# Patient Record
Sex: FEMALE | Race: WHITE | URBAN - METROPOLITAN AREA
[De-identification: names, ages, dates, MRNs, and addresses within clinical notes are randomized per-mention and may not be internally consistent; named-entity substitution may affect disease eponyms.]

---

## 2017-01-03 ENCOUNTER — OFFICE VISIT (OUTPATIENT)
Dept: GASTROENTEROLOGY | Facility: CLINIC | Age: 72
End: 2017-01-03
Attending: INTERNAL MEDICINE
Payer: MEDICARE

## 2017-01-03 VITALS
TEMPERATURE: 98.5 F | HEART RATE: 63 BPM | SYSTOLIC BLOOD PRESSURE: 135 MMHG | DIASTOLIC BLOOD PRESSURE: 81 MMHG | BODY MASS INDEX: 33.15 KG/M2 | HEIGHT: 61 IN | WEIGHT: 175.6 LBS | OXYGEN SATURATION: 93 %

## 2017-01-03 DIAGNOSIS — K22.2 ESOPHAGEAL STRICTURE: ICD-10-CM

## 2017-01-03 DIAGNOSIS — R13.10 DYSPHAGIA, UNSPECIFIED TYPE: ICD-10-CM

## 2017-01-03 DIAGNOSIS — R13.12 OROPHARYNGEAL DYSPHAGIA: Primary | ICD-10-CM

## 2017-01-03 PROCEDURE — 99212 OFFICE O/P EST SF 10 MIN: CPT | Mod: ZF

## 2017-01-03 PROCEDURE — 91034 GASTROESOPHAGEAL REFLUX TEST: CPT | Mod: ZF | Performed by: INTERNAL MEDICINE

## 2017-01-03 NOTE — PROGRESS NOTES
Note dictated    REQUESTING PHYSICIAN:  Veronika Pavon MD, and Candelario Estrada MD, from [facility] [place].      RESAON FOR CONSULTATION:  Globus sensation and oropharyngeal dysphagia.      ASSESSMENT:  This is a 71-year-old female who has been having oropharyngeal dysphagia for over a year, which has gotten worse since August, accompanied with a globus sensation that has become more persistent.      Regarding the patient's dysphagia, it appears to be oropharyngeal given the fact that it is only when she drinks liquids.  No dysphagia or odynophagia when she eats small meals or swallows pills. She says it sends her into a coughing spell when she drinks liquids. She has had a fairly thorough evaluation including negative upper endoscopy, negative esophageal biopsies, an esophogram which was not done with a pill that showed a possible distal stricture and esophageal dysmotility, as well as the family physician did a flexible laryngoscopy where he found irritation.  At this moment, we will plan on doing an esophogram with a pill and doing also a  video swallow study to evaluate for possible aspiration.  We will plan as well on doing esophageal manometry and pH impedance.  If this is negative and no clear cause is found, then we will plan on doing repeat upper endoscopy.  The patient also had a CT scan.  The scan was done of the neck and nasopharynx and was negative, and no clear cause was found for the patient's symptoms.      PLAN:   1.  Video swallow study with esophagram.   2.  Esophageal manometry with pH impedance.   3.  Continue the omeprazole as the patient is taking it.   4.  Lifestyle modifications discussed with the patient such as tuck in the chin to prevent aspiration.      Thank you for this consultation.  It was a pleasure to participate in the care of this patient.  Please contact us with any further questions.  A total of more than 45 minutes were spent with the patient and the patient's son,  more than 50% of which was counseling regarding the above delineated issues such as lifestyle modifications to try to prevent reflux, lifestyle modifications to try to prevent oropharyngeal dysphagia, among other medications.      Nguyễn Swain MD    [facility] - Department of Medicine  Division of Gastroenterology  ----------------------------------------------------------------------    HISTORY OF PRESENT ILLNESS:  This is a 71-year-old female who has been having worsening oropharyngeal dysphagia for the past year accompanied with a globus sensation.  The patient says that the started over a year ago when she felt pressure in the back of her neck which eventually increased to the point that she started swallowing water and the water would send her to coughing spells.  This was happening more frequently.  She saw her primary care provider, and her primary care provider got an esophagogram without a pill which showed concern for a distal esophageal stricture.  She had an upper endoscopy which was within normal limits and negative esophageal biopsies.  She had a CT of the neck which was also negative.  The PCP performed a flexible laryngoscopy which did not find any abnormalities.  The patient persists with symptoms; and therefore, she was instructed to tuck her chin when she swallowed and this greatly improved her sensation of liquid going down the wrong pipe.  She says that this has not ever happened with food.  No sensation of esophageal dysphagia or odynophagia.  No regurgitation, no reflux, no heartburn and no coughing.  She says that when she drinks liquids if she does not tuck her chin to her chest and drinks quickly she will feel like it goes down the wrong pipe and that will send her into a coughing spell.  It also has happened with no specific trigger, not drinking liquids.  She denies any weight loss.  No abdominal pain, diarrhea or constipation.  No mouth ulcers.  No  fever or chills.      REVIEW OF SYSTEMS:  A complete review of systems was performed.  Please refer to history of present illness for pertinent positives and negatives.  All other systems are negative.      PAST MEDICAL HISTORY:  Reviewed and compared with the patient.  Pertinent for knee surgery.      PREVIOUS ENDOSCOPY:  Endoscopy done in 08/2016 which was within normal limits with negative biopsies.      PERTINENT MEDICATIONS:  Reviewed with the patient.  Medication list was updated.  Currently on omeprazole 20 mg once a day.  No clear benefit from it.  Fish oil, calcium, magnesium, vitamin D3, glucosamine chondroitin and vitamins.      SOCIAL HISTORY:  Denies any tobacco, alcohol or drug use.      FAMILY HISTORY:  Extensive family history for different types of cancers which consists of multiple myeloma, brother with lung cancer, father had a heart attack.  No known esophageal cancer that she knows of.      PHYSICAL EXAMINATION:   VITAL SIGNS:  The patient's vital signs are stable.   GENERAL:  The patient is alert and oriented x3, in no acute distress.   HEENT:  Pupils equal, reactive to light bilaterally.  Sclerae nonicteric.  Oral mucosa well hydrated, no visible ulcers.     LYMPHADICS:  No palpable neck or supraclavicular lymphadenopathy.   ABDOMEN:  Soft, nontender, nondistended, no hepatomegaly palpated, no peritoneal signs.   LOWER EXTREMITIES:  No edema.   SKIN:  Warm, perfuse, no jaundice.   NEUROLOGIC:  Grossly intact.   PSYCHIATRIC:  Appropriate affect.      PERTINENT STUDIES:  Reviewed and compared with the patient and the patient's .

## 2017-01-03 NOTE — NURSING NOTE
"Orquidea Gillespie is a 71 year old female who presents for:  Chief Complaint   Patient presents with     Oncology Clinic Visit     New patient visit- Dysphasia globus sensation         Initial Vitals:  /81 mmHg  Pulse 63  Temp(Src) 98.5  F (36.9  C) (Oral)  Ht 1.549 m (5' 0.98\")  Wt 79.652 kg (175 lb 9.6 oz)  BMI 33.20 kg/m2  SpO2 93% Estimated body mass index is 33.2 kg/(m^2) as calculated from the following:    Height as of this encounter: 1.549 m (5' 0.98\").    Weight as of this encounter: 79.652 kg (175 lb 9.6 oz).. Body surface area is 1.85 meters squared. BP completed using cuff size: large  Data Unavailable No LMP recorded. Patient has had a hysterectomy. Allergies and medications reviewed.     Medications: Medication refills not needed today.  Pharmacy name entered into EPIC: [id number] [place] - [address] [place] [address]    Comments:     7 minutes for nursing intake (face to face time)   Rafaela Erwin CMA          "

## 2017-01-03 NOTE — PATIENT INSTRUCTIONS
I've included a brief summary of our discussion and care plan from today's visit below.  Please review this information with your primary care provider.  _______________________________________________________________________    Regarding the symptoms that you are experiencing, as discussed, we will proceed with the following tests to further determine the etiology.    - Continue the omeprazole that you are taking    - Continue to tuck you chin when you swallow.      - Schedule an esophagram, video swallow study and esophageal manometry with pH impedance.         Return to GI Clinic after the above to review your progress.  As we spoke, once we get results of the tests, we will be discussing them and making the necessary changes.   ______________________________________________________________________    It was a pleasure seeing you in clinic today - please be in touch if there are any further questions that arise following today's visit.  During business hours, you may reach my Clinic Coordinator at [phone].  For urgent/emergent questions after business hours, you may reach the on-call GI Fellow by contacting the [facility]  at [phone].    Any benign/non-urgent test results are usually communicated via letter or [employer] message within 1-2 weeks after completion.  Urgent results (those that require a change in the previously-discussed care plan) are usually communicated via a phone call once available from our clinic staff to discuss the results and the next steps in your evaluation.    I recommend signing up for [employer] access if you have not already done so and are comfortable with using a computer.  This allows for online access to your lab results and also helps you communicate efficiently with my clinic should any questions arise in your care.    To schedule a follow up appointment please call [phone], ask for Selene.    Sincerely,    Nguyễn Swain MD  Assistant    [facility] - Department of Medicine  Division of Gastroenterology

## 2017-01-03 NOTE — Clinical Note
1/3/2017       RE: Orquidea Gillespie  [address] [address] [address]     Dear Colleague,    Thank you for referring your patient, Orquidea Gillespie, to the [facility] at [facility]. Please see a copy of my visit note below.    Note dictated    REQUESTING PHYSICIAN:  Veronika Pavon MD, and Candelario Estrada MD, from [facility].      RESAON FOR CONSULTATION:  Globus sensation and oropharyngeal dysphagia.      ASSESSMENT:  This is a 71-year-old female who has been having oropharyngeal dysphagia for over a year, which has gotten worse since August, accompanied with a globus sensation that has become more persistent.      Regarding the patient's dysphagia, it appears to be oropharyngeal given the fact that it is only when she drinks liquids.  No dysphagia or odynophagia when she eats small meals or swallows pills. She says it sends her into a coughing spell when she drinks liquids. She has had a fairly thorough evaluation including negative upper endoscopy, negative esophageal biopsies, an esophogram which was not done with a pill that showed a possible distal stricture and esophageal dysmotility, as well as the family physician did a flexible laryngoscopy where he found irritation.  At this moment, we will plan on doing an esophogram with a pill and doing also a  video swallow study to evaluate for possible aspiration.  We will plan as well on doing esophageal manometry and pH impedance.  If this is negative and no clear cause is found, then we will plan on doing repeat upper endoscopy.  The patient also had a CT scan.  The scan was done of the neck and nasopharynx and was negative, and no clear cause was found for the patient's symptoms.      PLAN:   1.  Video swallow study with esophagram.   2.  Esophageal manometry with pH impedance.   3.  Continue the omeprazole as the patient is taking it.   4.  Lifestyle modifications discussed with the  patient such as tuck in the chin to prevent aspiration.      Thank you for this consultation.  It was a pleasure to participate in the care of this patient.  Please contact us with any further questions.  A total of more than 45 minutes were spent with the patient and the patient's son, more than 50% of which was counseling regarding the above delineated issues such as lifestyle modifications to try to prevent reflux, lifestyle modifications to try to prevent oropharyngeal dysphagia, among other medications.      Nguyễn Swain MD    [facility] - Department of Medicine  Division of Gastroenterology  ----------------------------------------------------------------------    HISTORY OF PRESENT ILLNESS:  This is a 71-year-old female who has been having worsening oropharyngeal dysphagia for the past year accompanied with a globus sensation.  The patient says that the started over a year ago when she felt pressure in the back of her neck which eventually increased to the point that she started swallowing water and the water would send her to coughing spells.  This was happening more frequently.  She saw her primary care provider, and her primary care provider got an esophagogram without a pill which showed concern for a distal esophageal stricture.  She had an upper endoscopy which was within normal limits and negative esophageal biopsies.  She had a CT of the neck which was also negative.  The PCP performed a flexible laryngoscopy which did not find any abnormalities.  The patient persists with symptoms; and therefore, she was instructed to tuck her chin when she swallowed and this greatly improved her sensation of liquid going down the wrong pipe.  She says that this has not ever happened with food.  No sensation of esophageal dysphagia or odynophagia.  No regurgitation, no reflux, no heartburn and no coughing.  She says that when she drinks liquids if she does not tuck her chin to  her chest and drinks quickly she will feel like it goes down the wrong pipe and that will send her into a coughing spell.  It also has happened with no specific trigger, not drinking liquids.  She denies any weight loss.  No abdominal pain, diarrhea or constipation.  No mouth ulcers.  No fever or chills.      REVIEW OF SYSTEMS:  A complete review of systems was performed.  Please refer to history of present illness for pertinent positives and negatives.  All other systems are negative.      PAST MEDICAL HISTORY:  Reviewed and compared with the patient.  Pertinent for knee surgery.      PREVIOUS ENDOSCOPY:  Endoscopy done in 08/2016 which was within normal limits with negative biopsies.      PERTINENT MEDICATIONS:  Reviewed with the patient.  Medication list was updated.  Currently on omeprazole 20 mg once a day.  No clear benefit from it.  Fish oil, calcium, magnesium, vitamin D3, glucosamine chondroitin and vitamins.      SOCIAL HISTORY:  Denies any tobacco, alcohol or drug use.      FAMILY HISTORY:  Extensive family history for different types of cancers which consists of multiple myeloma, brother with lung cancer, father had a heart attack.  No known esophageal cancer that she knows of.      PHYSICAL EXAMINATION:   VITAL SIGNS:  The patient's vital signs are stable.   GENERAL:  The patient is alert and oriented x3, in no acute distress.   HEENT:  Pupils equal, reactive to light bilaterally.  Sclerae nonicteric.  Oral mucosa well hydrated, no visible ulcers.     LYMPHADICS:  No palpable neck or supraclavicular lymphadenopathy.   ABDOMEN:  Soft, nontender, nondistended, no hepatomegaly palpated, no peritoneal signs.   LOWER EXTREMITIES:  No edema.   SKIN:  Warm, perfuse, no jaundice.   NEUROLOGIC:  Grossly intact.   PSYCHIATRIC:  Appropriate affect.      PERTINENT STUDIES:  Reviewed and compared with the patient and the patient's .       Again, thank you for allowing me to participate in the care of your  patient.      Sincerely,    Nguyễn Swain MD

## 2017-01-24 ENCOUNTER — THERAPY VISIT (OUTPATIENT)
Dept: SPEECH THERAPY | Facility: CLINIC | Age: 72
End: 2017-01-24
Attending: INTERNAL MEDICINE

## 2017-01-24 DIAGNOSIS — R13.12 OROPHARYNGEAL DYSPHAGIA: ICD-10-CM

## 2017-01-24 DIAGNOSIS — K22.2 ESOPHAGEAL STRICTURE: ICD-10-CM

## 2017-01-24 DIAGNOSIS — R13.10 DYSPHAGIA, UNSPECIFIED TYPE: ICD-10-CM

## 2017-01-24 NOTE — PROGRESS NOTES
" 01/24/17 0900       Present No   General Information   Type Of Visit Initial   Start Of Care Date 01/24/17   Referring Physician Nguyễn Thomas MD   Orders Evaluate And Treat   Orders Comment Video Swallow Study   Medical Diagnosis 1. Oropharyngeal Dysphagia 2. Esophageal stricture 3. Dysphagia, unspecified   Onset Of Illness/injury Or Date Of Surgery 01/03/17   Precautions/limitations No Known Precautions/limitations   Hearing WFL   Pertinent History of Current Problem/OT: Additional Occupational Profile Info Ms Gillespie is a 72 year old female who was referred for a swallowing evaluation and presents with concerns of coughing on liquids and a globus sensation. Pt has significant medical history of GERD for which she is currently taking omeprazole. Pt reports no difficulty with solids or swallowing medications however she experiences a globus sensation that is worse in the morning but does not completely resolve throughout the day. When drinking liquids, she reports that she sometimes feels as though things \"go down the wrong way\" and cause her to have difficulty breathing. Pt reports using a chin tuck while drinking liquids, however she does not feel it consistently helps relieve her symptoms.    Respiratory Status Room air   Prior Level Of Function Swallowing   Prior Level Of Function Comment Regular diet with thin liquids   Patient Role/employment History Retired   Living Environment House/[facility]   Home/community Accessibility Comments (flowsheet Row) Independent   General Observations Pt was pleasant and cooperative throughout the evaluation   Patient/family Goals Pt states desire to decrease swallowing difficulties   Pain Assessment   Pain Reported No   FALL RISK SCREEN   Have you fallen 2 or more times in the last year? No   Have you fallen and had an injury in the past year? No   Is the patient a fall risk? No   Clinical Swallow Evaluation   Oral Musculature generally intact "   Structural Abnormalities none present   Dentition present and adequate   Mucosal Quality good   Mandibular Strength and Mobility intact   Oral Labial Strength and Mobility WFL   Lingual Strength and Mobility WFL   Velar Elevation intact   Buccal Strength and Mobility intact   Laryngeal Function Swallow;Voicing initiated   VFSS Eval: Radiology   Radiologist Resident   Views Taken left lateral;A/P   Physical Location of Procedure [facility]   VFSS Eval: Thin Liquid Texture Trial   Mode of Presentation, Thin Liquid cup;self-fed   Order of Presentation 1, 2, 3, 9   Preparatory Phase WFL   Oral Phase, Thin Liquid Premature pharyngeal entry   Pharyngeal Phase, Thin Liquid Delayed swallow reflex;Residue in valleculae   Rosenbek's Penetration Aspiration Scale: Thin Liquid Trial Results 2 - contrast enters airway, remains above the vocal cords, no residue remains (penetration)   Response to Aspiration other (see comments)  (Trace residue cleared with second swallow)   Successful Strategies Trialed During Procedure, Thin Liquid chin tuck   Diagnostic Statement Pt demonstrates a delayed swallow trigger to the valleculae and premature spillage. Pt evidenced flash pentration as well as trace residue in the valleculae and pyriform sinus which spontaneously cleared with a second swallow.    VFSS Eval: Nectar Thick Liquid Texture Trial   Mode of Presentation, Nectar cup;self-fed   Order of Presentation 4, 5   Preparatory Phase WFL   Oral Phase, Nectar WFL   Pharyngeal Phase, [place] WFL   Rosenbek's Penetration Aspiration Scale: Nectar-Thick Liquid Trial Results 1 - no aspiration, contrast does not enter airway   Diagnostic Statement Slight trace residue noted in the valleculae and pyriform sinus which was cleared with successive swallows.    VFSS Eval: Puree Solid Texture Trial   Mode of Presentation, Puree spoon;self-fed   Order of Presentation 6   Preparatory Phase WFL   Oral Phase, Puree WFL    Pharyngeal Phase, Puree Delayed swallow reflex   Rosenbek's Penetration Aspiration Scale: Puree Food Trial Results 1 - no aspiration, contrast does not enter airway   Diagnostic Statement Pt demonstrated a delayed swallow trigger at the level of the valleculae. No aspiration or penetration observed.   VFSS Eval: Solid Food Texture Trial   Mode of Presentation, Solid self-fed   Order of Presentation 7   Preparatory Phase WFL   Oral Phase, Solid WFL   Pharyngeal Phase, Solid Delayed swallow reflex   Rosenbek's Penetration Aspiration Scale: Solid Food Trial Results 1 - no aspiration, contrast does not enter airway   Diagnostic Statement Pt demonstrated a delay swallow trigger at the level of the valleculae. Bolus was cleared with a single swallow. No aspiration or penetration noted.    Swallow Compensations   Swallow Compensations Chin tuck   Results No difficulties noted   Educational Assessment   Barriers to Learning No barriers   Preferred Learning Style Listening   Esophageal Phase of Swallow   Patient reports or presents with symptoms of esophageal dysphagia Yes   Esophageal comments Esophagram completed in todays evaluation. Please refer to radiology report for further information regarding esophageal function.   Swallow Eval: Clinical Impressions   Skilled Criteria for Therapy Intervention No problems identified which require skilled intervention   Dysphagia Outcome Severity Scale (MILAN) Level 6 - MILAN   Treatment Diagnosis Minimal Oropharyngeal Dysphagia   Diet texture recommendations Regular diet;Thin liquids   Recommended Feeding/Eating Techniques maintain upright posture during/after eating for 30 mins;small sips/bites;tuck chin during every swallow   Rehab Potential good, to achieve stated therapy goals   Clinical Impression Comments Ms Gillespie demonstrates minimal oropharyngeal dysphagia as characterized by intermittent flash penetration on thin liquids and delayed swallow trigger to the level of the  valleculae. Adequate ROM and strength of oral mechanism demonstrated. Clear vocal quality noted. Pt demonstrates minimal stasis in the valleculae which spontaneously clears with successive swallows. Pt demonstrated a decrease in flash penetration with trials of chin tuck for thin liquids. Esophagram completed, please refer to Radiologist report for further details. Recommend pt continue a regular consistency diet with thin liquids and use of chin tuck. Sit upright for all po intake and remain upright for 30 minutes after each meal. Pt to follow up with ordering provider. No further SLP services needed at this time. Thank you kindly for this referral.   Total Session Time   Total Session Time 40   Total Evaluation Time 40   Swallowing   Swallowing:  Current Status , Goal , Discharge -Kzzv Only-Modifier the same for all G-codes CI: 1-19% impairment   Swallowing: Current  & Discharge Modifier Rationale-Eval Only Modifier chosen based on the results of this evaluation when compared to TAHIR guidelines.

## 2017-01-25 ENCOUNTER — HOSPITAL ENCOUNTER (OUTPATIENT)
Facility: CLINIC | Age: 72
Discharge: HOME | End: 2017-01-25
Attending: INTERNAL MEDICINE | Admitting: INTERNAL MEDICINE
Payer: MEDICARE

## 2017-01-25 ENCOUNTER — SURGERY (OUTPATIENT)
Age: 72
End: 2017-01-25

## 2017-01-25 PROCEDURE — 91038 ESOPH IMPED FUNCT TEST > 1HR: CPT | Performed by: INTERNAL MEDICINE

## 2017-01-25 NOTE — OR NURSING
Pt here for manometry/24 hr ph. Procedure explained.  Catheter then placed easily to 42 cm. Unable to pass catheter past 42 cm.  Multiple positions and additional water given . Swallows given per protocol and pt tolerated  well. Catheter removed. Ph probe then placed at 31 cm unable to pass any farther. Pt tolerated well. study.DC and diary instructions  Pt dc to home in NAD.

## 2017-01-25 NOTE — IP AVS SNAPSHOT
[facility], [place], Endoscopy    [address] [address]    Phone:  [phone]                                       After Visit Summary   1/25/2017    Orquidea Gillespie    MRN: [id number]           After Visit Summary Signature Page     I have received my discharge instructions, and my questions have been answered. I have discussed any challenges I see with this plan with the nurse or doctor.    ..........................................................................................................................................  Patient/Patient Representative Signature      ..........................................................................................................................................  Patient Representative Print Name and Relationship to Patient    ..................................................               ................................................  Date                                            Time    ..........................................................................................................................................  Reviewed by Signature/Title    ...................................................              ..............................................  Date                                                            Time

## 2017-01-25 NOTE — DISCHARGE INSTRUCTIONS
1.  May resume regular diet.    2.  May have bloody nose, stuffy nose or sore throat after procedure.    3.  If 24 pH probe placed, return the next day to have probe removed.  Removal will only        take 5 minutes.    4.  Any questions call [phone] from 7AM to 4:30PM.  After hours call [phone]      and ask for GI fellow on call.

## 2017-01-25 NOTE — IP AVS SNAPSHOT
MRN:[id number]                      After Visit Summary   1/25/2017    Orquidea Gillespie    MRN: [id number]           Thank you!     Thank you for choosing [facility] for your care. Our goal is always to provide you with excellent care. Hearing back from our patients is one way we can continue to improve our services. Please take a few minutes to complete the written survey that you may receive in the mail after you visit with us. Thank you!        Patient Information     Date Of Birth          1945        About your hospital stay     You were admitted on:  January 25, 2017 You last received care in the:  [facility], Endoscopy    You were discharged on:  January 25, 2017       Who to Call     For medical emergencies, please call 911.  For non-urgent questions about your medical care, please call your primary care provider or clinic, [phone]  For questions related to your surgery, please call your surgery clinic        Attending Provider     Provider    Nguyễn Padilla MD       Primary Care Provider Office Phone # Fax #    Candelario Estrada [phone]       [address] [address]        Your next 10 appointments already scheduled     Feb 14, 2017  2:00 PM   (Arrive by 1:45 PM)   New Patient Visit with Sinan Sandoval PA-C   [facility] ([facility])    [address] [place] [id number] [phone]              Further instructions from your care team       1.  May resume regular diet.    2.  May have bloody nose, stuffy nose or sore throat after procedure.    3.  If 24 pH probe placed, return the next day to have probe removed.  Removal will only        take 5 minutes.    4.  Any questions call [phone] from 7AM to 4:30PM.  After hours call [phone]      and ask for GI fellow on call.      Pending Results     No orders found from 1/24/2017 to 1/26/2017.        "     Admission Information        Provider Department Dept Phone    2017 Nguyễn Swain MD [facility] [phone]      MyChart Information     [employer] lets you send messages to your doctor, view your test results, renew your prescriptions, schedule appointments and more. To sign up, go to www.[email or web address] . Click on \"Log in\" on the left side of the screen, which will take you to the Welcome page. Then click on \"Sign up Now\" on the right side of the page.     You will be asked to enter the access code listed below, as well as some personal information. Please follow the directions to create your username and password.     Your access code is: [email or web address]  Expires: 3/2/2017  2:21 PM     Your access code will  in 90 days. If you need help or a new code, please call your [facility] clinic or [phone].        Care EveryWhere ID     This is your Care EveryWhere ID. This could be used by other organizations to access your [facility] medical records  [id number]           Review of your medicines      UNREVIEWED medicines. Ask your doctor about these medicines        Dose / Directions    CALCIUM & MAGNESIUM CARBONATES PO        Dose:  3 tablet   Take 3 tablets by mouth daily.   Refills:  0       CENTRUM SILVER per tablet        Dose:  1 tablet   Take 1 tablet by mouth daily.   Refills:  0       FISH OIL PO        Dose:  3 tablet   Take 3 tablets by mouth daily.   Refills:  0       GLUCOSAMINE CHONDROITIN ADV PO        Dose:  2 tablet   Take 2 tablets by mouth daily.   Refills:  0       ICAPS LUTEIN-ZEAXANTHIN PO        Take  by mouth.   Refills:  0       OMEPRAZOLE PO        Take by mouth every morning   Refills:  0       vitamin D3 2000 UNITS Caps        Dose:  1 tablet   Take 1 tablet by mouth daily.   Refills:  0         CONTINUE these medicines which have NOT CHANGED        Dose / Directions    order for DME   Used for:  S/P knee replacement        [facility] [phone]   " Directions: Advance as Tolerated and Instructed by MD   Quantity:  1 Device   Refills:  0                Protect others around you: Learn how to safely use, store and throw away your medicines at www.disposemymeds.org.             Medication List: This is a list of all your medications and when to take them. Check marks below indicate your daily home schedule. Keep this list as a reference.      Medications           Morning Afternoon Evening Bedtime As Needed    CALCIUM & MAGNESIUM CARBONATES PO   Take 3 tablets by mouth daily.                                CENTRUM SILVER per tablet   Take 1 tablet by mouth daily.                                FISH OIL PO   Take 3 tablets by mouth daily.                                GLUCOSAMINE CHONDROITIN ADV PO   Take 2 tablets by mouth daily.                                ICAPS LUTEIN-ZEAXANTHIN PO   Take  by mouth.                                OMEPRAZOLE PO   Take by mouth every morning                                order for DME   [facility] [phone]   Directions: Advance as Tolerated and Instructed by MD                                vitamin D3 2000 UNITS Caps   Take 1 tablet by mouth daily.

## 2017-02-14 ENCOUNTER — OFFICE VISIT (OUTPATIENT)
Dept: GASTROENTEROLOGY | Facility: CLINIC | Age: 72
End: 2017-02-14
Attending: INTERNAL MEDICINE
Payer: MEDICARE

## 2017-02-14 VITALS
HEIGHT: 61 IN | BODY MASS INDEX: 33.47 KG/M2 | TEMPERATURE: 98.6 F | RESPIRATION RATE: 16 BRPM | HEART RATE: 65 BPM | DIASTOLIC BLOOD PRESSURE: 74 MMHG | OXYGEN SATURATION: 97 % | WEIGHT: 177.3 LBS | SYSTOLIC BLOOD PRESSURE: 126 MMHG

## 2017-02-14 DIAGNOSIS — K90.9 INTESTINAL MALABSORPTION, UNSPECIFIED TYPE: ICD-10-CM

## 2017-02-14 DIAGNOSIS — K21.9 GASTROESOPHAGEAL REFLUX DISEASE WITHOUT ESOPHAGITIS: Primary | ICD-10-CM

## 2017-02-14 DIAGNOSIS — K30 FUNCTIONAL DYSPEPSIA: ICD-10-CM

## 2017-02-14 DIAGNOSIS — E55.9 VITAMIN D DEFICIENCY: ICD-10-CM

## 2017-02-14 PROCEDURE — 99212 OFFICE O/P EST SF 10 MIN: CPT | Mod: ZF

## 2017-02-14 PROCEDURE — 36415 COLL VENOUS BLD VENIPUNCTURE: CPT

## 2017-02-14 PROCEDURE — 82306 VITAMIN D 25 HYDROXY: CPT | Performed by: PHYSICIAN ASSISTANT

## 2017-02-14 RX ORDER — OMEPRAZOLE 10 MG/1
20 CAPSULE, DELAYED RELEASE ORAL 2 TIMES DAILY
Qty: 60 CAPSULE | Refills: 11 | Status: SHIPPED | OUTPATIENT
Start: 2017-02-14

## 2017-02-14 NOTE — PATIENT INSTRUCTIONS
It was a pleasure taking care of you today.  I've included a brief summary of our discussion and care plan from today's visit below.  Please review this information with your primary care provider.  ______________________________________________________________________    My recommendations are summarized as follows:    -- Increase your Omeprazole to 20 mg twice daily.  Be sure to take this 30 minutes before you eat.   -- Labs today     REFLUX PRECAUTIONS:   1) Prop actual bed up.  Propping up with just pillows can actually make things worse if it is causing you to bend at the waist while sleeping  2) Avoid alcohol, as this causes relaxation of the sphincter and makes it easier for food to travel up esophagus.  If you do drink alcohol, do not drink before bed or before meals.  3)  Eat small meals  4) Avoid triggers such as fatty foods, processed foods, and high fructose corn syrup (or food that contain these)    -- Continue with chin-tuck when swallowing    Return to Esophageal Clinic in 6 months to review your progress.    ______________________________________________________________________    Who do I call with any questions after my visit?  Please be in touch if there are any further questions that arise following today's visit.  There are multiple ways to contact your gastroenterology care team.        During business hours, you may reach a Gastroenterology nurse at [phone].       To schedule or reschedule an appointment, please call [phone].       You can always send a secure message through [employer].  [employer] messages are answered by your nurse or doctor typically within 24 hours.  Please allow extra time on weekends and holidays.        For urgent/emergent questions after business hours, you may reach the on-call GI Fellow by contacting the [facility] at [phone].     How will I get the results of any tests ordered?    You will receive all of your results.  If you have  signed up for [employer], any tests ordered at your visit will be available to you after your physician reviews them.  Typically this takes 1-2 weeks.  If there are urgent results that require a change in your care plan, your physician or nurse will call you to discuss the next steps.      What is [employer]?  [employer] is a secure way for you to access all of your healthcare records from the [facility].  It is a web based computer program, so you can sign on to it from any location.  It also allows you to send secure messages to your care team.  I recommend signing up for [employer] access if you have not already done so and are comfortable with using a computer.      How to I schedule a follow-up visit?  If you did not schedule a follow-up visit today, please call [phone] to schedule a follow-up office visit.        Sincerely,    Sinan Sandoval PA-C  [facility]  Division of Gastroenterology

## 2017-02-14 NOTE — PROGRESS NOTES
GI CLINIC VISIT    CC/REFERRING MD:  Debi Pavon  REASON FOR FOLLOW UP: Globus sensation and oropharyngeal dysphagia  COLLABORATING PHYSICIAN: Nguyễn Bravo MD    ASSESSMENT/PLAN:  Orquidea is a 72-year-old female who presents for followup regarding her oropharyngeal dysphagia versus esophageal dysphagia and also a large hiatal hernia who is currently on 20 mg of omeprazole daily with persistent globus sensation.      1.  Oropharyngeal dysphagia versus esophageal dysphagia.  This is very difficult to distinguish in this patient, as she denies any food getting stuck in the esophagus but also does not report any coughing episodes or having difficulties with actually swallowing.  The patient reports it is liquids that getting stuck in the throat with a tight sensation.  Video swallow was performed that showed a large hiatal hernia.  Esophageal manometry was difficult to determine, as there was some peristaltic and normal esophageal pressures, but other missed and low amplitude swallows suggesting possible decreased esophageal motility.  pH impedance that did show patient had evidence of reflux episodes primarily when lying down.  We discussed lifestyle modifications today and to continue the chin tuck while swallowing, elevating the bed and not eating 3 hours prior to lying down.  She is only on 20 mg of omeprazole once daily.  We have increased the PPI to 20 mg twice daily.  If symptoms do not improve with increasing the omeprazole, we will consider consulting her surgical colleagues regarding repair of her hiatal hernia with gastropexy over a Nissen procedure.  We would not want to limit the patient's esophageal function with a Nissen procedure.  If this appears to be more oropharyngeal dysphagia, we will involve our ENT colleagues for further evaluation and treatment.     PLAN:  -- Increase omeprazole to 20 mg twice daily  -- If patient continues to have symptoms we will move forward with surgical  consult  -- Labs for chronic PPI use     RTC 6 months    Thank you for this consultation.  It was a pleasure to participate in the care of this patient; please contact us with any further questions.       Sinan Sandoval PA-C  Division of Gastroenterology, Hepatology and Nutrition  [facility]      HPI  A 72-year-old female with a history over the last year with oropharyngeal dysphagia and globus sensation.  Patient reports that this has always been with drinking fluids where she feels that the fluid gets stuck.  This is not followed by cough, and patient does not report any solid food getting stuck.  She has experienced voice changes or saying that her voice would give out over time.  She noticed that she would pull on the collar of her shirt feeling like it was tight, even though it was loose around her neck, feeling like her throat was tight.  The patient has had an upper endoscopy which was within normal limits and negative esophageal biopsies.  She had a CT of the neck which was also negative.  A flexible laryngoscopy was performed by her primary and did not show any abnormalities but did show some irritation.  The patient was placed on a PPI at that time, omeprazole 20 mg daily.  She has been instructed to tuck her chin when she swallows which has greatly improved her sensation of liquid going down the wrong pipe.  She completed a video swallow with esophagram showing a large hiatal hernia and flash penetration.  The esophageal manometry and pH impedance showed indeterminate esophageal motility as not all the swallow suggested adequate peristalsis.  The pH impedance suggested persistent acid episodes, especially while lying down, which was performed while the patient was on 20 mg of omeprazole.  No clear cause found of the patient's symptoms.  More recently, the patient has not been able to eat as much, getting full on smaller meals and more bloated.  She continues to have difficulty with only liquids  and not solids.  She denies any heartburn symptoms and does not identify with any triggers.  She denies any weight loss, abdominal pain, diarrhea or constipation.     ROS:    No fevers or chills  No weight loss  No blurry vision, double vision or change in vision  No sore throat  No lymphadenopathy  No headache, paraesthesias, or weakness in a limb  No shortness of breath or wheezing  No chest pain or pressure  No arthralgias or myalgias  No rashes or skin changes  No odynophagia or dysphagia  No BRBPR, hematochezia, melena  No dysuria, frequency or urgency  No hot/cold intolerance or polyria  No anxiety or depression    PROBLEM LIST  Patient Active Problem List    Diagnosis Date Noted     S/P knee replacement 12/15/2011     Priority: Medium       PERTINENT PAST MEDICAL HISTORY:  Past Medical History   Diagnosis Date     Arthritis      Pain in joint, lower leg        PREVIOUS SURGERIES:  Past Surgical History   Procedure Laterality Date     Joint replacement       Left knee-8/2003 and right knee 4/2008     Hysterectomy       Rectocele       Release carpal tunnel bilateral       Arthroplasty revision knee  11/18/2011     Procedure:ARTHROPLASTY REVISION KNEE; Revision Right Total Knee; Surgeon:SUSAN CASTILLO; Location:[facility]     Hc esoph/gas reflux test w nasal imped >1 hr N/A 1/25/2017     Procedure: ESOPHAGEAL IMPEDENCE FUNCTION TEST WITH 24 HOUR PH GREATER THAN 1 HOUR;  Surgeon: Nguyễn Padilla MD;  Location: [facility]     ALLERGIES:     Allergies   Allergen Reactions     Celebrex [Celecoxib] Rash       PERTINENT MEDICATIONS:    Current Outpatient Prescriptions:      OMEPRAZOLE PO, Take by mouth every morning, Disp: , Rfl:      ORDER FOR DME, [facility] [phone]   Directions: Advance as Tolerated and Instructed by MD, Disp: 1 Device, Rfl: 0     Specialty Vitamins Products (ICAPS LUTEIN-ZEAXANTHIN PO), Take  by mouth., Disp: , Rfl:      CALCIUM & MAGNESIUM CARBONATES PO, Take 3 tablets by mouth  "daily., Disp: , Rfl:      Cholecalciferol (VITAMIN D3) 2000 UNIT CAPS, Take 1 tablet by mouth daily., Disp: , Rfl:      Misc Natural Products (GLUCOSAMINE CHONDROITIN ADV PO), Take 2 tablets by mouth daily., Disp: , Rfl:      Multiple Vitamins-Minerals (CENTRUM SILVER) per tablet, Take 1 tablet by mouth daily., Disp: , Rfl:      Omega-3 Fatty Acids (FISH OIL PO), Take 3 tablets by mouth daily., Disp: , Rfl:     SOCIAL HISTORY:  Social History     Social History     Marital status:      Spouse name: N/A     Number of children: N/A     Years of education: N/A     Occupational History     Not on file.     Social History Main Topics     Smoking status: Never Smoker     Smokeless tobacco: Not on file     Alcohol use Yes      Comment: 2 SRINKS PER MONTH     Drug use: No     Sexual activity: Yes     Partners: Male     Other Topics Concern     Not on file     Social History Narrative       FAMILY HISTORY:  Family History   Problem Relation Age of Onset     CANCER Mother      HEART DISEASE Father      CANCER Brother      CANCER Sister        Past/family/social history reviewed and no changes    PHYSICAL EXAMINATION:  Constitutional: aaox3, cooperative, pleasant, not dyspneic/diaphoretic, no acute distress  Vitals reviewed: /74 (BP Location: Left arm, Patient Position: Chair, Cuff Size: Adult Large)  Pulse 65  Temp 98.6  F (37  C) (Oral)  Resp 16  Ht 1.549 m (5' 0.98\")  Wt 80.4 kg (177 lb 4.8 oz)  SpO2 97%  Breastfeeding? No  BMI 33.52 kg/m2  Wt:   Wt Readings from Last 2 Encounters:   02/14/17 80.4 kg (177 lb 4.8 oz)   01/03/17 79.7 kg (175 lb 9.6 oz)      Eyes: Sclera anicteric/injected  Ears/nose/mouth/throat: Normal oropharynx without ulcers or exudate, mucus membranes moist, hearing intact  Neck: supple, thyroid normal size  CV: No edema  Respiratory: Unlabored breathing  Lymph: No axillary, submandibular, supraclavicular or inguinal lymphadenopathy  Abd: Nondistended, +bs, no hepatosplenomegaly, nontender, " no peritoneal signs  Skin: warm, perfused, no jaundice  Psych: Normal affect  MSK: Normal gait      PERTINENT STUDIES:    Office Visit on 10/24/2011   Component Date Value Ref Range Status     ABO 10/24/2011 A   Final     RH(D) 10/24/2011  Pos   Final     Antibody Screen 10/24/2011 Neg   Final     Specimen Expires 10/24/2011 10/27/2011   Final

## 2017-02-14 NOTE — NURSING NOTE
"Orquidea Gillespie is a 72 year old female who presents for:  Chief Complaint   Patient presents with     Oncology Clinic Visit     FU appt        Initial Vitals:  /74 (BP Location: Left arm, Patient Position: Chair, Cuff Size: Adult Large)  Pulse 65  Temp 98.6  F (37  C) (Oral)  Resp 16  Ht 1.549 m (5' 0.98\")  Wt 80.4 kg (177 lb 4.8 oz)  SpO2 97%  Breastfeeding? No  BMI 33.52 kg/m2 Estimated body mass index is 33.52 kg/(m^2) as calculated from the following:    Height as of this encounter: 1.549 m (5' 0.98\").    Weight as of this encounter: 80.4 kg (177 lb 4.8 oz).. Body surface area is 1.86 meters squared. BP completed using cuff size: large  Data Unavailable No LMP recorded. Patient has had a hysterectomy. Allergies and medications reviewed.     Medications: Medication refills not needed today.  Pharmacy name entered into EPIC: [id number] [address] [place] [address]    Comments:     6 minutes for nursing intake (face to face time)   Alanis Matthew CMA        "

## 2017-02-14 NOTE — LETTER
2/14/2017       RE: Orquidea Gillespie  [address] [address] [address]     Dear Colleague,    Thank you for referring your patient, Orquidea Gillespie, to the [facility] at [facility]. Please see a copy of my visit note below.    GI CLINIC VISIT    CC/REFERRING MD:  Debi Pavon  REASON FOR FOLLOW UP: Globus sensation and oropharyngeal dysphagia  COLLABORATING PHYSICIAN: Nguyễn Bravo MD    ASSESSMENT/PLAN:  Orquidea is a 72-year-old female who presents for followup regarding her oropharyngeal dysphagia versus esophageal dysphagia and also a large hiatal hernia who is currently on 20 mg of omeprazole daily with persistent globus sensation.      1.  Oropharyngeal dysphagia versus esophageal dysphagia.  This is very difficult to distinguish in this patient, as she denies any food getting stuck in the esophagus but also does not report any coughing episodes or having difficulties with actually swallowing.  The patient reports it is liquids that getting stuck in the throat with a tight sensation.  Video swallow was performed that showed a large hiatal hernia.  Esophageal manometry was difficult to determine, as there was some peristaltic and normal esophageal pressures, but other missed and low amplitude swallows suggesting possible decreased esophageal motility.  pH impedance that did show patient had evidence of reflux episodes primarily when lying down.  We discussed lifestyle modifications today and to continue the chin tuck while swallowing, elevating the bed and not eating 3 hours prior to lying down.  She is only on 20 mg of omeprazole once daily.  We have increased the PPI to 20 mg twice daily.  If symptoms do not improve with increasing the omeprazole, we will consider consulting her surgical colleagues regarding repair of her hiatal hernia with gastropexy over a Nissen procedure.  We would not want to limit the patient's esophageal function  with a Nissen procedure.  If this appears to be more oropharyngeal dysphagia, we will involve our ENT colleagues for further evaluation and treatment.     PLAN:  -- Increase omeprazole to 20 mg twice daily  -- If patient continues to have symptoms we will move forward with surgical consult  -- Labs for chronic PPI use     RTC 6 months    Thank you for this consultation.  It was a pleasure to participate in the care of this patient; please contact us with any further questions.       Sinan Sandoval PA-C  Division of Gastroenterology, Hepatology and Nutrition  [facility]      HPI  A 72-year-old female with a history over the last year with oropharyngeal dysphagia and globus sensation.  Patient reports that this has always been with drinking fluids where she feels that the fluid gets stuck.  This is not followed by cough, and patient does not report any solid food getting stuck.  She has experienced voice changes or saying that her voice would give out over time.  She noticed that she would pull on the collar of her shirt feeling like it was tight, even though it was loose around her neck, feeling like her throat was tight.  The patient has had an upper endoscopy which was within normal limits and negative esophageal biopsies.  She had a CT of the neck which was also negative.  A flexible laryngoscopy was performed by her primary and did not show any abnormalities but did show some irritation.  The patient was placed on a PPI at that time, omeprazole 20 mg daily.  She has been instructed to tuck her chin when she swallows which has greatly improved her sensation of liquid going down the wrong pipe.  She completed a video swallow with esophagram showing a large hiatal hernia and flash penetration.  The esophageal manometry and pH impedance showed indeterminate esophageal motility as not all the swallow suggested adequate peristalsis.  The pH impedance suggested persistent acid episodes, especially while lying  down, which was performed while the patient was on 20 mg of omeprazole.  No clear cause found of the patient's symptoms.  More recently, the patient has not been able to eat as much, getting full on smaller meals and more bloated.  She continues to have difficulty with only liquids and not solids.  She denies any heartburn symptoms and does not identify with any triggers.  She denies any weight loss, abdominal pain, diarrhea or constipation.     ROS:    No fevers or chills  No weight loss  No blurry vision, double vision or change in vision  No sore throat  No lymphadenopathy  No headache, paraesthesias, or weakness in a limb  No shortness of breath or wheezing  No chest pain or pressure  No arthralgias or myalgias  No rashes or skin changes  No odynophagia or dysphagia  No BRBPR, hematochezia, melena  No dysuria, frequency or urgency  No hot/cold intolerance or polyria  No anxiety or depression    PROBLEM LIST  Patient Active Problem List    Diagnosis Date Noted     S/P knee replacement 12/15/2011     Priority: Medium       PERTINENT PAST MEDICAL HISTORY:  Past Medical History   Diagnosis Date     Arthritis      Pain in joint, lower leg        PREVIOUS SURGERIES:  Past Surgical History   Procedure Laterality Date     Joint replacement       Left knee-8/2003 and right knee 4/2008     Hysterectomy       Rectocele       Release carpal tunnel bilateral       Arthroplasty revision knee  11/18/2011     Procedure:ARTHROPLASTY REVISION KNEE; Revision Right Total Knee; Surgeon:SUSAN CASTILLO; Location:[facility] OR     Hc esoph/gas reflux test w nasal imped >1 hr N/A 1/25/2017     Procedure: ESOPHAGEAL IMPEDENCE FUNCTION TEST WITH 24 HOUR PH GREATER THAN 1 HOUR;  Surgeon: Nguyễn Padilla MD;  Location: [facility]     ALLERGIES:     Allergies   Allergen Reactions     Celebrex [Celecoxib] Rash       PERTINENT MEDICATIONS:    Current Outpatient Prescriptions:      OMEPRAZOLE PO, Take by mouth every morning, Disp: , Rfl:      ORDER  "FOR DME, [facility] [phone]   Directions: Advance as Tolerated and Instructed by MD, Disp: 1 Device, Rfl: 0     Specialty Vitamins Products (ICAPS LUTEIN-ZEAXANTHIN PO), Take  by mouth., Disp: , Rfl:      CALCIUM & MAGNESIUM CARBONATES PO, Take 3 tablets by mouth daily., Disp: , Rfl:      Cholecalciferol (VITAMIN D3) 2000 UNIT CAPS, Take 1 tablet by mouth daily., Disp: , Rfl:      Misc Natural Products (GLUCOSAMINE CHONDROITIN ADV PO), Take 2 tablets by mouth daily., Disp: , Rfl:      Multiple Vitamins-Minerals (CENTRUM SILVER) per tablet, Take 1 tablet by mouth daily., Disp: , Rfl:      Omega-3 Fatty Acids (FISH OIL PO), Take 3 tablets by mouth daily., Disp: , Rfl:     SOCIAL HISTORY:  Social History     Social History     Marital status:      Spouse name: N/A     Number of children: N/A     Years of education: N/A     Occupational History     Not on file.     Social History Main Topics     Smoking status: Never Smoker     Smokeless tobacco: Not on file     Alcohol use Yes      Comment: 2 SRINKS PER MONTH     Drug use: No     Sexual activity: Yes     Partners: Male     Other Topics Concern     Not on file     Social History Narrative       FAMILY HISTORY:  Family History   Problem Relation Age of Onset     CANCER Mother      HEART DISEASE Father      CANCER Brother      CANCER Sister        Past/family/social history reviewed and no changes    PHYSICAL EXAMINATION:  Constitutional: aaox3, cooperative, pleasant, not dyspneic/diaphoretic, no acute distress  Vitals reviewed: /74 (BP Location: Left arm, Patient Position: Chair, Cuff Size: Adult Large)  Pulse 65  Temp 98.6  F (37  C) (Oral)  Resp 16  Ht 1.549 m (5' 0.98\")  Wt 80.4 kg (177 lb 4.8 oz)  SpO2 97%  Breastfeeding? No  BMI 33.52 kg/m2  Wt:   Wt Readings from Last 2 Encounters:   02/14/17 80.4 kg (177 lb 4.8 oz)   01/03/17 79.7 kg (175 lb 9.6 oz)      Eyes: Sclera anicteric/injected  Ears/nose/mouth/throat: Normal oropharynx " without ulcers or exudate, mucus membranes moist, hearing intact  Neck: supple, thyroid normal size  CV: No edema  Respiratory: Unlabored breathing  Lymph: No axillary, submandibular, supraclavicular or inguinal lymphadenopathy  Abd: Nondistended, +bs, no hepatosplenomegaly, nontender, no peritoneal signs  Skin: warm, perfused, no jaundice  Psych: Normal affect  MSK: Normal gait      PERTINENT STUDIES:    Office Visit on 10/24/2011   Component Date Value Ref Range Status     ABO 10/24/2011 A   Final     RH(D) 10/24/2011  Pos   Final     Antibody Screen 10/24/2011 Neg   Final     Specimen Expires 10/24/2011 10/27/2011   Final           Again, thank you for allowing me to participate in the care of your patient.      Sincerely,    Sinan Sandoval PA-C

## 2017-02-14 NOTE — MR AVS SNAPSHOT
After Visit Summary   2/14/2017    Orquidea Gillespie    MRN: [id number]           Patient Information     Date Of Birth          1945        Visit Information        Provider Department      2/14/2017 2:00 PM Sinan Sandoval PA-C [facility]        Today's Diagnoses     Gastroesophageal reflux disease without esophagitis    -  1    Functional dyspepsia         Vitamin D deficiency         Intestinal malabsorption, unspecified type           Care Instructions    It was a pleasure taking care of you today.  I've included a brief summary of our discussion and care plan from today's visit below.  Please review this information with your primary care provider.  ______________________________________________________________________    My recommendations are summarized as follows:    -- Increase your Omeprazole to 20 mg twice daily.  Be sure to take this 30 minutes before you eat.   -- Labs today     REFLUX PRECAUTIONS:   1) Prop actual bed up.  Propping up with just pillows can actually make things worse if it is causing you to bend at the waist while sleeping  2) Avoid alcohol, as this causes relaxation of the sphincter and makes it easier for food to travel up esophagus.  If you do drink alcohol, do not drink before bed or before meals.  3)  Eat small meals  4) Avoid triggers such as fatty foods, processed foods, and high fructose corn syrup (or food that contain these)    -- Continue with chin-dalick when swallowing    Return to Esophageal Clinic in 6 months to review your progress.    ______________________________________________________________________    Who do I call with any questions after my visit?  Please be in touch if there are any further questions that arise following today's visit.  There are multiple ways to contact your gastroenterology care team.        During business hours, you may reach a Gastroenterology nurse at [phone].       To schedule or reschedule an  appointment, please call [phone].       You can always send a secure message through INXPO.  INXPO messages are answered by your nurse or doctor typically within 24 hours.  Please allow extra time on weekends and holidays.        For urgent/emergent questions after business hours, you may reach the on-call GI Fellow by contacting the [facility]  at [phone].     How will I get the results of any tests ordered?    You will receive all of your results.  If you have signed up for [employer], any tests ordered at your visit will be available to you after your physician reviews them.  Typically this takes 1-2 weeks.  If there are urgent results that require a change in your care plan, your physician or nurse will call you to discuss the next steps.      What is INXPO?  INXPO is a secure way for you to access all of your healthcare records from the [facility].  It is a web based computer program, so you can sign on to it from any location.  It also allows you to send secure messages to your care team.  I recommend signing up for INXPO access if you have not already done so and are comfortable with using a computer.      How to I schedule a follow-up visit?  If you did not schedule a follow-up visit today, please call [phone] to schedule a follow-up office visit.        Sincerely,    Sinan Sandoval PA-C  [facility]  Division of Gastroenterology                  Follow-ups after your visit        Follow-up notes from your care team     Return in about 6 months (around 8/14/2017).      Your next 10 appointments already scheduled     Feb 14, 2017  2:30 PM [facility]   [facility] with  [facility]   [facility] ([facility])    [address] [place] [id number]   [phone]            Aug 15, 2017 12:00 PM CDT   (Arrive by 11:45 AM)   Return Visit with G[facility]  "Cancer Clinic ([facility])    [address]  2nd Floor  [facility] [id number] [phone]              Future tests that were ordered for you today     Open Future Orders        Priority Expected Expires Ordered    Ferritin Routine  2/14/2018 2/14/2017    Iron Routine  2/14/2018 2/14/2017    Iron and iron binding capacity Routine  2/14/2018 2/14/2017    Magnesium Routine  2/14/2018 2/14/2017    Phosphorus Routine  2/14/2018 2/14/2017    Vitamin B12 Routine  2/14/2018 2/14/2017    Folate Routine  2/14/2018 2/14/2017    Zinc Routine  2/14/2018 2/14/2017            Who to contact     If you have questions or need follow up information about today's clinic visit or your schedule please contact [facility] directly at [phone].  Normal or non-critical lab and imaging results will be communicated to you by MyChart, letter or phone within 4 business days after the clinic has received the results. If you do not hear from us within 7 days, please contact the clinic through [employer] or phone. If you have a critical or abnormal lab result, we will notify you by phone as soon as possible.  Submit refill requests through ApnaPaisa or call your pharmacy and they will forward the refill request to us. Please allow 3 business days for your refill to be completed.          Additional Information About Your Visit        MyChart Information     ApnaPaisa lets you send messages to your doctor, view your test results, renew your prescriptions, schedule appointments and more. To sign up, go to [email or web address] . Click on \"Log in\" on the left side of the screen, which will take you to the Welcome page. Then click on \"Sign up Now\" on the right side of the page.     You will be asked to enter the access code listed below, as well as some personal information. Please follow the directions to create your username and password.     Your access code is: [email or web address]  Expires: " "3/2/2017  2:21 PM     Your access code will  in 90 days. If you need help or a new code, please call your [facility] or [phone].        Care EveryWhere ID     This is your Care EveryWhere ID. This could be used by other organizations to access your [facility] medical records  [id number]        Your Vitals Were     Pulse Temperature Respirations Height Pulse Oximetry Breastfeeding?    65 98.6  F (37  C) (Oral) 16 1.549 m (5' 0.98\") 97% No    BMI (Body Mass Index)                   33.52 kg/m2            Blood Pressure from Last 3 Encounters:   17 126/74   17 135/81   11 100/62    Weight from Last 3 Encounters:   17 80.4 kg (177 lb 4.8 oz)   17 79.7 kg (175 lb 9.6 oz)   11 77.5 kg (170 lb 13.7 oz)              We Performed the Following     Vitamin D Deficiency          Today's Medication Changes          These changes are accurate as of: 17  2:24 PM.  If you have any questions, ask your nurse or doctor.               These medicines have changed or have updated prescriptions.        Dose/Directions    omeprazole 10 MG CR capsule   Commonly known as:  priLOSEC   This may have changed:    - how much to take  - when to take this   Used for:  Gastroesophageal reflux disease without esophagitis   Changed by:  Sinan Sandoval PA-C        Dose:  20 mg   Take 2 capsules (20 mg) by mouth 2 times daily   Quantity:  60 capsule   Refills:  11            Where to get your medicines      These medications were sent to [id number] - [place], [place] - [address]  [address] [address]     Phone:  [phone]     omeprazole 10 MG CR capsule                Primary Care Provider Office Phone # Fax #    Candelario Sean [phone]       [facility]  [facility] [address]        Thank you!     Thank you for choosing [facility]  for your care. Our goal is always to provide you with excellent " care. Hearing back from our patients is one way we can continue to improve our services. Please take a few minutes to complete the written survey that you may receive in the mail after your visit with us. Thank you!             Your Updated Medication List - Protect others around you: Learn how to safely use, store and throw away your medicines at www.disposemymeds.org.          This list is accurate as of: 2/14/17  2:24 PM.  Always use your most recent med list.                   Brand Name Dispense Instructions for use    CALCIUM & MAGNESIUM CARBONATES PO      Take 3 tablets by mouth daily.       CENTRUM SILVER per tablet      Take 1 tablet by mouth daily.       FISH OIL PO      Take 3 tablets by mouth daily.       GLUCOSAMINE CHONDROITIN ADV PO      Take 2 tablets by mouth daily.       ICAPS LUTEIN-ZEAXANTHIN PO      Take  by mouth.       omeprazole 10 MG CR capsule    priLOSEC    60 capsule    Take 2 capsules (20 mg) by mouth 2 times daily       order for DME     1 Device    [facility] [phone]   Directions: Advance as Tolerated and Instructed by MD       vitamin D3 2000 UNITS Caps      Take 1 tablet by mouth daily.

## 2017-02-15 LAB — DEPRECATED CALCIDIOL+CALCIFEROL SERPL-MC: 45 UG/L (ref 20–75)

## 2017-02-15 ASSESSMENT — PATIENT HEALTH QUESTIONNAIRE - PHQ9: SUM OF ALL RESPONSES TO PHQ QUESTIONS 1-9: 0

## 2020-07-17 ENCOUNTER — TRANSFERRED RECORDS (OUTPATIENT)
Dept: HEALTH INFORMATION MANAGEMENT | Facility: CLINIC | Age: 75
End: 2020-07-17

## 2020-08-06 ENCOUNTER — TRANSFERRED RECORDS (OUTPATIENT)
Dept: HEALTH INFORMATION MANAGEMENT | Facility: CLINIC | Age: 75
End: 2020-08-06

## 2020-08-18 ENCOUNTER — TRANSFERRED RECORDS (OUTPATIENT)
Dept: HEALTH INFORMATION MANAGEMENT | Facility: CLINIC | Age: 75
End: 2020-08-18

## 2020-08-21 ENCOUNTER — TRANSFERRED RECORDS (OUTPATIENT)
Dept: HEALTH INFORMATION MANAGEMENT | Facility: CLINIC | Age: 75
End: 2020-08-21

## 2020-08-24 ENCOUNTER — TRANSCRIBE ORDERS (OUTPATIENT)
Dept: OTHER | Age: 75
End: 2020-08-24

## 2020-08-24 DIAGNOSIS — C83.398 DIFFUSE LARGE B-CELL LYMPHOMA OF EXTRANODAL SITE: Primary | ICD-10-CM

## 2020-08-25 ENCOUNTER — TELEPHONE (OUTPATIENT)
Dept: TRANSPLANT | Facility: CLINIC | Age: 75
End: 2020-08-25

## 2020-08-25 NOTE — TELEPHONE ENCOUNTER
Reached out to patient to discuss referral from  to see  in consultation. Patient is not sure if she would like to seek care here or with [facility], and would like to have further discussion with . I provided patient with our phone number if moving forward she would like to see . Message left with ' office notifying her of patient decision to hold on consultation with  for Friday of this week.